# Patient Record
Sex: MALE | Race: WHITE | Employment: FULL TIME | URBAN - METROPOLITAN AREA
[De-identification: names, ages, dates, MRNs, and addresses within clinical notes are randomized per-mention and may not be internally consistent; named-entity substitution may affect disease eponyms.]

---

## 2017-05-23 ENCOUNTER — OFFICE VISIT (OUTPATIENT)
Dept: INTERNAL MEDICINE CLINIC | Age: 52
End: 2017-05-23

## 2017-05-23 VITALS
BODY MASS INDEX: 29.8 KG/M2 | WEIGHT: 220 LBS | HEIGHT: 72 IN | SYSTOLIC BLOOD PRESSURE: 128 MMHG | HEART RATE: 94 BPM | OXYGEN SATURATION: 96 % | DIASTOLIC BLOOD PRESSURE: 88 MMHG

## 2017-05-23 DIAGNOSIS — Q21.0 VSD (VENTRICULAR SEPTAL DEFECT): ICD-10-CM

## 2017-05-23 DIAGNOSIS — Z11.59 NEED FOR HEPATITIS C SCREENING TEST: ICD-10-CM

## 2017-05-23 DIAGNOSIS — E78.5 HYPERLIPIDEMIA, UNSPECIFIED HYPERLIPIDEMIA TYPE: ICD-10-CM

## 2017-05-23 DIAGNOSIS — F33.0 MILD EPISODE OF RECURRENT MAJOR DEPRESSIVE DISORDER (HCC): Primary | ICD-10-CM

## 2017-05-23 DIAGNOSIS — Z11.4 SCREENING FOR HIV (HUMAN IMMUNODEFICIENCY VIRUS): ICD-10-CM

## 2017-05-23 DIAGNOSIS — Z13.31 POSITIVE DEPRESSION SCREENING: ICD-10-CM

## 2017-05-23 DIAGNOSIS — Z13.1 SCREENING FOR DIABETES MELLITUS: ICD-10-CM

## 2017-05-23 PROCEDURE — 3017F COLORECTAL CA SCREEN DOC REV: CPT | Performed by: INTERNAL MEDICINE

## 2017-05-23 PROCEDURE — G8431 POS CLIN DEPRES SCRN F/U DOC: HCPCS | Performed by: INTERNAL MEDICINE

## 2017-05-23 PROCEDURE — G8419 CALC BMI OUT NRM PARAM NOF/U: HCPCS | Performed by: INTERNAL MEDICINE

## 2017-05-23 PROCEDURE — G0444 DEPRESSION SCREEN ANNUAL: HCPCS | Performed by: INTERNAL MEDICINE

## 2017-05-23 PROCEDURE — 99215 OFFICE O/P EST HI 40 MIN: CPT | Performed by: INTERNAL MEDICINE

## 2017-05-23 PROCEDURE — G8427 DOCREV CUR MEDS BY ELIG CLIN: HCPCS | Performed by: INTERNAL MEDICINE

## 2017-05-23 PROCEDURE — 1036F TOBACCO NON-USER: CPT | Performed by: INTERNAL MEDICINE

## 2017-05-23 RX ORDER — DESVENLAFAXINE 50 MG/1
50 TABLET, EXTENDED RELEASE ORAL DAILY
Qty: 90 TABLET | Refills: 1 | Status: SHIPPED | OUTPATIENT
Start: 2017-05-23 | End: 2018-05-18 | Stop reason: SDUPTHER

## 2017-05-23 ASSESSMENT — PATIENT HEALTH QUESTIONNAIRE - PHQ9
SUM OF ALL RESPONSES TO PHQ9 QUESTIONS 1 & 2: 4
3. TROUBLE FALLING OR STAYING ASLEEP: 0
7. TROUBLE CONCENTRATING ON THINGS, SUCH AS READING THE NEWSPAPER OR WATCHING TELEVISION: 2
1. LITTLE INTEREST OR PLEASURE IN DOING THINGS: 2
SUM OF ALL RESPONSES TO PHQ QUESTIONS 1-9: 8
8. MOVING OR SPEAKING SO SLOWLY THAT OTHER PEOPLE COULD HAVE NOTICED. OR THE OPPOSITE, BEING SO FIGETY OR RESTLESS THAT YOU HAVE BEEN MOVING AROUND A LOT MORE THAN USUAL: 0
9. THOUGHTS THAT YOU WOULD BE BETTER OFF DEAD, OR OF HURTING YOURSELF: 0
2. FEELING DOWN, DEPRESSED OR HOPELESS: 2
4. FEELING TIRED OR HAVING LITTLE ENERGY: 2
10. IF YOU CHECKED OFF ANY PROBLEMS, HOW DIFFICULT HAVE THESE PROBLEMS MADE IT FOR YOU TO DO YOUR WORK, TAKE CARE OF THINGS AT HOME, OR GET ALONG WITH OTHER PEOPLE: 0
5. POOR APPETITE OR OVEREATING: 0
6. FEELING BAD ABOUT YOURSELF - OR THAT YOU ARE A FAILURE OR HAVE LET YOURSELF OR YOUR FAMILY DOWN: 0

## 2017-05-25 DIAGNOSIS — Z13.1 SCREENING FOR DIABETES MELLITUS: ICD-10-CM

## 2017-05-25 DIAGNOSIS — E78.5 HYPERLIPIDEMIA, UNSPECIFIED HYPERLIPIDEMIA TYPE: ICD-10-CM

## 2017-05-25 DIAGNOSIS — Z11.59 NEED FOR HEPATITIS C SCREENING TEST: ICD-10-CM

## 2017-05-25 DIAGNOSIS — Z11.4 SCREENING FOR HIV (HUMAN IMMUNODEFICIENCY VIRUS): ICD-10-CM

## 2017-05-25 PROBLEM — Q21.0 VSD (VENTRICULAR SEPTAL DEFECT): Status: ACTIVE | Noted: 2017-05-25

## 2017-05-25 LAB
A/G RATIO: 1.7 (ref 1.1–2.2)
ALBUMIN SERPL-MCNC: 4.6 G/DL (ref 3.4–5)
ALP BLD-CCNC: 65 U/L (ref 40–129)
ALT SERPL-CCNC: 27 U/L (ref 10–40)
ANION GAP SERPL CALCULATED.3IONS-SCNC: 15 MMOL/L (ref 3–16)
AST SERPL-CCNC: 23 U/L (ref 15–37)
BILIRUB SERPL-MCNC: 0.9 MG/DL (ref 0–1)
BUN BLDV-MCNC: 12 MG/DL (ref 7–20)
CALCIUM SERPL-MCNC: 9.3 MG/DL (ref 8.3–10.6)
CHLORIDE BLD-SCNC: 103 MMOL/L (ref 99–110)
CHOLESTEROL, TOTAL: 254 MG/DL (ref 0–199)
CO2: 25 MMOL/L (ref 21–32)
CREAT SERPL-MCNC: 0.7 MG/DL (ref 0.9–1.3)
GFR AFRICAN AMERICAN: >60
GFR NON-AFRICAN AMERICAN: >60
GLOBULIN: 2.7 G/DL
GLUCOSE BLD-MCNC: 85 MG/DL (ref 70–99)
HDLC SERPL-MCNC: 66 MG/DL (ref 40–60)
HEPATITIS C ANTIBODY: NORMAL
LDL CHOLESTEROL CALCULATED: 170 MG/DL
POTASSIUM SERPL-SCNC: 4.4 MMOL/L (ref 3.5–5.1)
SODIUM BLD-SCNC: 143 MMOL/L (ref 136–145)
TOTAL PROTEIN: 7.3 G/DL (ref 6.4–8.2)
TRIGL SERPL-MCNC: 92 MG/DL (ref 0–150)
VLDLC SERPL CALC-MCNC: 18 MG/DL

## 2017-05-25 ASSESSMENT — ENCOUNTER SYMPTOMS
EYE REDNESS: 0
BACK PAIN: 0
ABDOMINAL PAIN: 0
SHORTNESS OF BREATH: 0
WHEEZING: 0
VOMITING: 0
COUGH: 0
SORE THROAT: 0
NAUSEA: 0
DIARRHEA: 0
CONSTIPATION: 0
CHEST TIGHTNESS: 0
RHINORRHEA: 0
SINUS PRESSURE: 0

## 2017-05-26 LAB
ESTIMATED AVERAGE GLUCOSE: 102.5 MG/DL
HBA1C MFR BLD: 5.2 %

## 2017-05-27 LAB — HIV-1 AND HIV-2 ANTIBODIES: NEGATIVE

## 2017-07-25 ENCOUNTER — TELEPHONE (OUTPATIENT)
Dept: INTERNAL MEDICINE CLINIC | Age: 52
End: 2017-07-25

## 2017-07-27 ENCOUNTER — OFFICE VISIT (OUTPATIENT)
Dept: INTERNAL MEDICINE CLINIC | Age: 52
End: 2017-07-27

## 2017-07-27 VITALS
DIASTOLIC BLOOD PRESSURE: 90 MMHG | HEART RATE: 82 BPM | SYSTOLIC BLOOD PRESSURE: 138 MMHG | OXYGEN SATURATION: 97 % | BODY MASS INDEX: 30.79 KG/M2 | WEIGHT: 227 LBS

## 2017-07-27 DIAGNOSIS — F33.1 MODERATE EPISODE OF RECURRENT MAJOR DEPRESSIVE DISORDER (HCC): Primary | ICD-10-CM

## 2017-07-27 PROCEDURE — 99214 OFFICE O/P EST MOD 30 MIN: CPT | Performed by: INTERNAL MEDICINE

## 2017-07-27 PROCEDURE — 1036F TOBACCO NON-USER: CPT | Performed by: INTERNAL MEDICINE

## 2017-07-27 PROCEDURE — G8417 CALC BMI ABV UP PARAM F/U: HCPCS | Performed by: INTERNAL MEDICINE

## 2017-07-27 PROCEDURE — G8427 DOCREV CUR MEDS BY ELIG CLIN: HCPCS | Performed by: INTERNAL MEDICINE

## 2017-07-27 PROCEDURE — 3017F COLORECTAL CA SCREEN DOC REV: CPT | Performed by: INTERNAL MEDICINE

## 2017-07-27 RX ORDER — BUPROPION HYDROCHLORIDE 300 MG/1
300 TABLET ORAL EVERY MORNING
Qty: 30 TABLET | Refills: 2 | Status: SHIPPED | OUTPATIENT
Start: 2017-07-27 | End: 2018-05-09 | Stop reason: SDUPTHER

## 2017-07-27 RX ORDER — CLONAZEPAM 0.5 MG/1
0.5 TABLET ORAL 2 TIMES DAILY PRN
Qty: 30 TABLET | Refills: 1 | Status: SHIPPED | OUTPATIENT
Start: 2017-07-27 | End: 2018-05-30 | Stop reason: SDUPTHER

## 2018-05-09 DIAGNOSIS — F33.1 MODERATE EPISODE OF RECURRENT MAJOR DEPRESSIVE DISORDER (HCC): ICD-10-CM

## 2018-05-10 RX ORDER — BUPROPION HYDROCHLORIDE 300 MG/1
300 TABLET ORAL EVERY MORNING
Qty: 30 TABLET | Refills: 2 | Status: SHIPPED | OUTPATIENT
Start: 2018-05-10 | End: 2018-05-30 | Stop reason: SDUPTHER

## 2018-05-10 RX ORDER — CLONAZEPAM 0.5 MG/1
0.5 TABLET ORAL 2 TIMES DAILY PRN
Qty: 30 TABLET | Refills: 1 | OUTPATIENT
Start: 2018-05-10

## 2018-05-18 ENCOUNTER — TELEPHONE (OUTPATIENT)
Dept: INTERNAL MEDICINE CLINIC | Age: 53
End: 2018-05-18

## 2018-05-18 DIAGNOSIS — F33.0 MILD EPISODE OF RECURRENT MAJOR DEPRESSIVE DISORDER (HCC): ICD-10-CM

## 2018-05-21 RX ORDER — DESVENLAFAXINE 50 MG/1
TABLET, EXTENDED RELEASE ORAL
Qty: 30 TABLET | Refills: 0 | Status: SHIPPED | OUTPATIENT
Start: 2018-05-21 | End: 2018-05-30 | Stop reason: SDUPTHER

## 2018-05-30 ENCOUNTER — OFFICE VISIT (OUTPATIENT)
Dept: INTERNAL MEDICINE CLINIC | Age: 53
End: 2018-05-30

## 2018-05-30 VITALS
DIASTOLIC BLOOD PRESSURE: 82 MMHG | SYSTOLIC BLOOD PRESSURE: 146 MMHG | WEIGHT: 214 LBS | TEMPERATURE: 98.1 F | HEART RATE: 77 BPM | OXYGEN SATURATION: 97 % | BODY MASS INDEX: 29.02 KG/M2 | RESPIRATION RATE: 16 BRPM

## 2018-05-30 DIAGNOSIS — F41.9 ANXIETY: Primary | ICD-10-CM

## 2018-05-30 DIAGNOSIS — F33.1 MODERATE EPISODE OF RECURRENT MAJOR DEPRESSIVE DISORDER (HCC): ICD-10-CM

## 2018-05-30 PROCEDURE — 3017F COLORECTAL CA SCREEN DOC REV: CPT | Performed by: INTERNAL MEDICINE

## 2018-05-30 PROCEDURE — 1036F TOBACCO NON-USER: CPT | Performed by: INTERNAL MEDICINE

## 2018-05-30 PROCEDURE — G8427 DOCREV CUR MEDS BY ELIG CLIN: HCPCS | Performed by: INTERNAL MEDICINE

## 2018-05-30 PROCEDURE — G8417 CALC BMI ABV UP PARAM F/U: HCPCS | Performed by: INTERNAL MEDICINE

## 2018-05-30 PROCEDURE — G0444 DEPRESSION SCREEN ANNUAL: HCPCS | Performed by: INTERNAL MEDICINE

## 2018-05-30 PROCEDURE — 99213 OFFICE O/P EST LOW 20 MIN: CPT | Performed by: INTERNAL MEDICINE

## 2018-05-30 RX ORDER — BUPROPION HYDROCHLORIDE 300 MG/1
300 TABLET ORAL EVERY MORNING
Qty: 90 TABLET | Refills: 2 | Status: SHIPPED | OUTPATIENT
Start: 2018-05-30 | End: 2019-03-11 | Stop reason: SDUPTHER

## 2018-05-30 RX ORDER — DESVENLAFAXINE 50 MG/1
TABLET, EXTENDED RELEASE ORAL
Qty: 90 TABLET | Refills: 1 | Status: SHIPPED | OUTPATIENT
Start: 2018-05-30 | End: 2019-03-11 | Stop reason: SDUPTHER

## 2018-05-30 RX ORDER — CLONAZEPAM 0.5 MG/1
0.5 TABLET ORAL 2 TIMES DAILY PRN
Qty: 30 TABLET | Refills: 1 | Status: SHIPPED | OUTPATIENT
Start: 2018-05-30 | End: 2019-03-11 | Stop reason: ALTCHOICE

## 2018-05-30 ASSESSMENT — PATIENT HEALTH QUESTIONNAIRE - PHQ9
1. LITTLE INTEREST OR PLEASURE IN DOING THINGS: 1
2. FEELING DOWN, DEPRESSED OR HOPELESS: 0
7. TROUBLE CONCENTRATING ON THINGS, SUCH AS READING THE NEWSPAPER OR WATCHING TELEVISION: 1
10. IF YOU CHECKED OFF ANY PROBLEMS, HOW DIFFICULT HAVE THESE PROBLEMS MADE IT FOR YOU TO DO YOUR WORK, TAKE CARE OF THINGS AT HOME, OR GET ALONG WITH OTHER PEOPLE: 0
3. TROUBLE FALLING OR STAYING ASLEEP: 1
8. MOVING OR SPEAKING SO SLOWLY THAT OTHER PEOPLE COULD HAVE NOTICED. OR THE OPPOSITE, BEING SO FIGETY OR RESTLESS THAT YOU HAVE BEEN MOVING AROUND A LOT MORE THAN USUAL: 0
SUM OF ALL RESPONSES TO PHQ QUESTIONS 1-9: 3
SUM OF ALL RESPONSES TO PHQ9 QUESTIONS 1 & 2: 1
6. FEELING BAD ABOUT YOURSELF - OR THAT YOU ARE A FAILURE OR HAVE LET YOURSELF OR YOUR FAMILY DOWN: 0
4. FEELING TIRED OR HAVING LITTLE ENERGY: 0
1. LITTLE INTEREST OR PLEASURE IN DOING THINGS: 0
5. POOR APPETITE OR OVEREATING: 1
SUM OF ALL RESPONSES TO PHQ9 QUESTIONS 1 & 2: 0
9. THOUGHTS THAT YOU WOULD BE BETTER OFF DEAD, OR OF HURTING YOURSELF: 0
SUM OF ALL RESPONSES TO PHQ QUESTIONS 1-9: 1
2. FEELING DOWN, DEPRESSED OR HOPELESS: 0

## 2018-05-30 ASSESSMENT — ENCOUNTER SYMPTOMS
EYES NEGATIVE: 1
ALLERGIC/IMMUNOLOGIC NEGATIVE: 1
GASTROINTESTINAL NEGATIVE: 1

## 2018-07-30 ENCOUNTER — OFFICE VISIT (OUTPATIENT)
Dept: INTERNAL MEDICINE CLINIC | Age: 53
End: 2018-07-30

## 2018-07-30 VITALS
DIASTOLIC BLOOD PRESSURE: 80 MMHG | WEIGHT: 215 LBS | SYSTOLIC BLOOD PRESSURE: 128 MMHG | HEART RATE: 78 BPM | OXYGEN SATURATION: 98 % | BODY MASS INDEX: 29.16 KG/M2

## 2018-07-30 DIAGNOSIS — F33.1 MODERATE EPISODE OF RECURRENT MAJOR DEPRESSIVE DISORDER (HCC): Primary | ICD-10-CM

## 2018-07-30 PROCEDURE — 3017F COLORECTAL CA SCREEN DOC REV: CPT | Performed by: NURSE PRACTITIONER

## 2018-07-30 PROCEDURE — 1036F TOBACCO NON-USER: CPT | Performed by: NURSE PRACTITIONER

## 2018-07-30 PROCEDURE — G8417 CALC BMI ABV UP PARAM F/U: HCPCS | Performed by: NURSE PRACTITIONER

## 2018-07-30 PROCEDURE — 99213 OFFICE O/P EST LOW 20 MIN: CPT | Performed by: NURSE PRACTITIONER

## 2018-07-30 PROCEDURE — G8427 DOCREV CUR MEDS BY ELIG CLIN: HCPCS | Performed by: NURSE PRACTITIONER

## 2018-07-30 NOTE — PATIENT INSTRUCTIONS
Patient called to reschedule her 7-20-17 appointment with Lashae Nolan to 6-23-17.  Patient said she wanted a sooner appointment.     No call back needed unless questions.   Take medication every day, take Pristiq in pm  Take Clonazepam twice a day for 3 weeks  After 3 weeks, try taking one clonazepam midday to see how it is effective  Continue to drink plenty of water

## 2019-02-06 DIAGNOSIS — F33.1 MODERATE EPISODE OF RECURRENT MAJOR DEPRESSIVE DISORDER (HCC): ICD-10-CM

## 2019-02-07 RX ORDER — CLONAZEPAM 0.5 MG/1
TABLET ORAL
Qty: 30 TABLET | Refills: 0 | OUTPATIENT
Start: 2019-02-07

## 2019-03-11 ENCOUNTER — OFFICE VISIT (OUTPATIENT)
Dept: INTERNAL MEDICINE CLINIC | Age: 54
End: 2019-03-11
Payer: COMMERCIAL

## 2019-03-11 VITALS
HEART RATE: 72 BPM | DIASTOLIC BLOOD PRESSURE: 98 MMHG | BODY MASS INDEX: 29.12 KG/M2 | SYSTOLIC BLOOD PRESSURE: 138 MMHG | RESPIRATION RATE: 16 BRPM | TEMPERATURE: 98.3 F | HEIGHT: 72 IN | WEIGHT: 215 LBS

## 2019-03-11 DIAGNOSIS — Z23 NEED FOR PROPHYLACTIC VACCINATION AND INOCULATION AGAINST VARICELLA: ICD-10-CM

## 2019-03-11 DIAGNOSIS — Z12.11 SCREENING FOR COLON CANCER: ICD-10-CM

## 2019-03-11 DIAGNOSIS — R03.0 ELEVATED BP WITHOUT DIAGNOSIS OF HYPERTENSION: Primary | ICD-10-CM

## 2019-03-11 DIAGNOSIS — E78.00 HYPERCHOLESTEROLEMIA: ICD-10-CM

## 2019-03-11 DIAGNOSIS — F33.1 MODERATE EPISODE OF RECURRENT MAJOR DEPRESSIVE DISORDER (HCC): ICD-10-CM

## 2019-03-11 DIAGNOSIS — Z13.1 SCREENING FOR DIABETES MELLITUS: ICD-10-CM

## 2019-03-11 PROBLEM — E78.5 HYPERLIPIDEMIA: Status: RESOLVED | Noted: 2017-05-25 | Resolved: 2019-03-11

## 2019-03-11 PROCEDURE — G8427 DOCREV CUR MEDS BY ELIG CLIN: HCPCS | Performed by: INTERNAL MEDICINE

## 2019-03-11 PROCEDURE — 99214 OFFICE O/P EST MOD 30 MIN: CPT | Performed by: INTERNAL MEDICINE

## 2019-03-11 PROCEDURE — G8417 CALC BMI ABV UP PARAM F/U: HCPCS | Performed by: INTERNAL MEDICINE

## 2019-03-11 PROCEDURE — 1036F TOBACCO NON-USER: CPT | Performed by: INTERNAL MEDICINE

## 2019-03-11 PROCEDURE — 3017F COLORECTAL CA SCREEN DOC REV: CPT | Performed by: INTERNAL MEDICINE

## 2019-03-11 PROCEDURE — G8484 FLU IMMUNIZE NO ADMIN: HCPCS | Performed by: INTERNAL MEDICINE

## 2019-03-11 RX ORDER — BUSPIRONE HYDROCHLORIDE 7.5 MG/1
7.5 TABLET ORAL 3 TIMES DAILY PRN
Qty: 90 TABLET | Refills: 0 | Status: SHIPPED | OUTPATIENT
Start: 2019-03-11 | End: 2019-04-10

## 2019-03-11 RX ORDER — DESVENLAFAXINE 50 MG/1
TABLET, EXTENDED RELEASE ORAL
Qty: 90 TABLET | Refills: 1 | Status: SHIPPED | OUTPATIENT
Start: 2019-03-11 | End: 2020-02-25 | Stop reason: SDUPTHER

## 2019-03-11 RX ORDER — BUPROPION HYDROCHLORIDE 300 MG/1
300 TABLET ORAL EVERY MORNING
Qty: 90 TABLET | Refills: 1 | Status: SHIPPED | OUTPATIENT
Start: 2019-03-11 | End: 2020-02-25 | Stop reason: SDUPTHER

## 2019-03-11 SDOH — HEALTH STABILITY: MENTAL HEALTH: HOW MANY STANDARD DRINKS CONTAINING ALCOHOL DO YOU HAVE ON A TYPICAL DAY?: 1 OR 2

## 2019-03-11 SDOH — HEALTH STABILITY: MENTAL HEALTH: HOW OFTEN DO YOU HAVE A DRINK CONTAINING ALCOHOL?: 4 OR MORE TIMES A WEEK

## 2019-03-11 ASSESSMENT — PATIENT HEALTH QUESTIONNAIRE - PHQ9
SUM OF ALL RESPONSES TO PHQ QUESTIONS 1-9: 0
SUM OF ALL RESPONSES TO PHQ QUESTIONS 1-9: 0
SUM OF ALL RESPONSES TO PHQ9 QUESTIONS 1 & 2: 0
2. FEELING DOWN, DEPRESSED OR HOPELESS: 0
1. LITTLE INTEREST OR PLEASURE IN DOING THINGS: 0

## 2020-02-25 ENCOUNTER — OFFICE VISIT (OUTPATIENT)
Dept: INTERNAL MEDICINE CLINIC | Age: 55
End: 2020-02-25
Payer: COMMERCIAL

## 2020-02-25 VITALS
WEIGHT: 215.6 LBS | HEIGHT: 72 IN | BODY MASS INDEX: 29.2 KG/M2 | RESPIRATION RATE: 14 BRPM | HEART RATE: 89 BPM | SYSTOLIC BLOOD PRESSURE: 124 MMHG | DIASTOLIC BLOOD PRESSURE: 88 MMHG | OXYGEN SATURATION: 96 %

## 2020-02-25 PROCEDURE — G8417 CALC BMI ABV UP PARAM F/U: HCPCS | Performed by: INTERNAL MEDICINE

## 2020-02-25 PROCEDURE — G8484 FLU IMMUNIZE NO ADMIN: HCPCS | Performed by: INTERNAL MEDICINE

## 2020-02-25 PROCEDURE — G8427 DOCREV CUR MEDS BY ELIG CLIN: HCPCS | Performed by: INTERNAL MEDICINE

## 2020-02-25 PROCEDURE — 3017F COLORECTAL CA SCREEN DOC REV: CPT | Performed by: INTERNAL MEDICINE

## 2020-02-25 PROCEDURE — 1036F TOBACCO NON-USER: CPT | Performed by: INTERNAL MEDICINE

## 2020-02-25 PROCEDURE — 99214 OFFICE O/P EST MOD 30 MIN: CPT | Performed by: INTERNAL MEDICINE

## 2020-02-25 RX ORDER — BUSPIRONE HYDROCHLORIDE 7.5 MG/1
7.5 TABLET ORAL 3 TIMES DAILY PRN
Qty: 90 TABLET | Refills: 1 | Status: SHIPPED | OUTPATIENT
Start: 2020-02-25 | End: 2021-03-16

## 2020-02-25 RX ORDER — DESVENLAFAXINE 50 MG/1
TABLET, EXTENDED RELEASE ORAL
Qty: 90 TABLET | Refills: 1 | Status: SHIPPED | OUTPATIENT
Start: 2020-02-25 | End: 2020-08-06

## 2020-02-25 RX ORDER — BUPROPION HYDROCHLORIDE 300 MG/1
300 TABLET ORAL EVERY MORNING
Qty: 90 TABLET | Refills: 1 | Status: SHIPPED | OUTPATIENT
Start: 2020-02-25 | End: 2020-08-06

## 2020-02-25 RX ORDER — BUSPIRONE HYDROCHLORIDE 7.5 MG/1
7.5 TABLET ORAL 3 TIMES DAILY PRN
COMMUNITY
End: 2020-02-25 | Stop reason: SDUPTHER

## 2020-02-25 ASSESSMENT — ENCOUNTER SYMPTOMS
COLOR CHANGE: 0
DIARRHEA: 0
EYE PAIN: 0
WHEEZING: 0
BACK PAIN: 0
PHOTOPHOBIA: 0
SINUS PAIN: 0
CONSTIPATION: 0
SHORTNESS OF BREATH: 0
VOMITING: 0
SINUS PRESSURE: 0
ABDOMINAL PAIN: 0
NAUSEA: 0
COUGH: 0
TROUBLE SWALLOWING: 0

## 2020-02-25 NOTE — PROGRESS NOTES
2020    Wiliam Lord (:  1965) is a 53 y.o. male, here for evaluation of the following medical concerns:    HPI    Depression/anxiety - On pristiq 50mg daily, wellbutrin 300mg, buspar as needed - feels works to take the edge off. The other two medications have been for some years. Life stress much improved. The 10-year ASCVD risk score (Louisa Jones, et al., 2013) is: 5.5%    Values used to calculate the score:      Age: 53 years      Sex: Male      Is Non- : No      Diabetic: No      Tobacco smoker: No      Systolic Blood Pressure: 177 mmHg      Is BP treated: No      HDL Cholesterol: 66 mg/dL      Total Cholesterol: 254 mg/dL    Has VSD from childhood. Has not had TTE in adulthood. No SOB, leg swelling, abdominal swelling, PND/orthopnea. Review of Systems   Constitutional: Negative for appetite change, fatigue, fever and unexpected weight change. No night sweats   HENT: Negative for congestion, ear pain, hearing loss, nosebleeds, sinus pressure, sinus pain, sneezing, tinnitus and trouble swallowing. Eyes: Negative for photophobia, pain and visual disturbance. Respiratory: Negative for cough, shortness of breath and wheezing. Cardiovascular: Negative for chest pain, palpitations and leg swelling. Gastrointestinal: Negative for abdominal pain, constipation, diarrhea, nausea and vomiting. Endocrine: Negative for cold intolerance, heat intolerance, polydipsia, polyphagia and polyuria. Genitourinary: Negative for difficulty urinating, dysuria, frequency, hematuria and urgency. Musculoskeletal: Negative for arthralgias, back pain, joint swelling, myalgias and neck pain. Skin: Negative for color change and rash. Allergic/Immunologic: Negative for environmental allergies, food allergies and immunocompromised state. Neurological: Negative for dizziness, syncope, speech difficulty, weakness, light-headedness, numbness and headaches. Hematological: Negative for adenopathy. Does not bruise/bleed easily. Psychiatric/Behavioral: Negative for dysphoric mood and sleep disturbance. The patient is not nervous/anxious. Prior to Visit Medications    Medication Sig Taking?  Authorizing Provider   busPIRone (BUSPAR) 7.5 MG tablet Take 7.5 mg by mouth 3 times daily as needed Yes Historical Provider, MD   buPROPion (WELLBUTRIN XL) 300 MG extended release tablet Take 1 tablet by mouth every morning Yes Kellie Cagle DO   desvenlafaxine succinate (PRISTIQ) 50 MG TB24 extended release tablet TAKE 1 TABLET DAILY Yes Kellie Cagle DO        No Known Allergies    Past Medical History:   Diagnosis Date    Depression     Heart murmur     VSD (ventricular septal defect and aortic arch hypoplasia        Past Surgical History:   Procedure Laterality Date    WISDOM TOOTH EXTRACTION         Social History     Socioeconomic History    Marital status:      Spouse name: Not on file    Number of children: 1    Years of education: Not on file    Highest education level: Not on file   Occupational History    Occupation:      Comment: [employer]    Financial resource strain: Not on file    Food insecurity:     Worry: Not on file     Inability: Not on file   e-contratos needs:     Medical: Not on file     Non-medical: Not on file   Tobacco Use    Smoking status: Never Smoker    Smokeless tobacco: Never Used   Substance and Sexual Activity    Alcohol use: Yes     Frequency: 4 or more times a week     Drinks per session: 1 or 2     Binge frequency: Never     Comment: One glass of wine nightly    Drug use: No    Sexual activity: Yes     Partners: Female   Lifestyle    Physical activity:     Days per week: Not on file     Minutes per session: Not on file    Stress: Not on file   Relationships    Social connections:     Talks on phone: Not on file     Gets together: Not on file     Attends Hindu service: Not on not radiating to carotids obscuring S1/S2) present. No friction rub. No gallop. Pulmonary:      Effort: Pulmonary effort is normal.      Breath sounds: Normal breath sounds. No wheezing or rales. Abdominal:      General: Bowel sounds are normal.      Palpations: Abdomen is soft. Musculoskeletal: Normal range of motion. General: No tenderness or deformity. Right lower leg: No edema. Left lower leg: No edema. Lymphadenopathy:      Cervical: No cervical adenopathy. Skin:     General: Skin is warm and dry. Findings: No lesion or rash. Neurological:      General: No focal deficit present. Mental Status: He is alert and oriented to person, place, and time. Coordination: Coordination normal.   Psychiatric:         Mood and Affect: Mood normal.         Behavior: Behavior normal.         ASSESSMENT/PLAN:  Hilario Borrero was seen today for established new doctor. Diagnoses and all orders for this visit:    Mild episode of recurrent major depressive disorder ([facility] [address]  Stable on pristiq, wellbutrin, prn buspar. Continue those meds. Screening for colon cancer  -     [facility] - Don Elaine MD, Gastroenterology, [facility]    Encounter for lipid screening for cardiovascular disease  -     Lipid Panel; Future    Screening for diabetes mellitus (DM)  -     Hemoglobin A1C; Future    Hypercholesterolemia  Due for assessment.   -     TSH with Reflex; Future    Moderate episode of recurrent major depressive disorder (HCC)  -     buPROPion (WELLBUTRIN XL) 300 MG extended release tablet; Take 1 tablet by mouth every morning  -     desvenlafaxine succinate (PRISTIQ) 50 MG TB24 extended release tablet; TAKE 1 TABLET DAILY  -     busPIRone (BUSPAR) 7.5 MG tablet; Take 1 tablet by mouth 3 times daily as needed (anxiety)    VSD (ventricular septal defect)  Asymptomatic. Needs assessment of size, shunt, right sided pressures.  Consider cardiology referral.   -     ECHO Complete 2D W Doppler W Color;

## 2020-08-06 RX ORDER — DESVENLAFAXINE 50 MG/1
TABLET, EXTENDED RELEASE ORAL
Qty: 90 TABLET | Refills: 3 | Status: SHIPPED | OUTPATIENT
Start: 2020-08-06 | End: 2021-03-09 | Stop reason: SDUPTHER

## 2020-08-06 RX ORDER — BUPROPION HYDROCHLORIDE 300 MG/1
TABLET ORAL
Qty: 90 TABLET | Refills: 3 | Status: SHIPPED | OUTPATIENT
Start: 2020-08-06

## 2021-03-09 ENCOUNTER — VIRTUAL VISIT (OUTPATIENT)
Dept: INTERNAL MEDICINE CLINIC | Age: 56
End: 2021-03-09

## 2021-03-09 DIAGNOSIS — F32.2 SEVERE DEPRESSION (HCC): Primary | ICD-10-CM

## 2021-03-09 DIAGNOSIS — F33.1 MODERATE EPISODE OF RECURRENT MAJOR DEPRESSIVE DISORDER (HCC): ICD-10-CM

## 2021-03-09 PROCEDURE — 99214 OFFICE O/P EST MOD 30 MIN: CPT | Performed by: INTERNAL MEDICINE

## 2021-03-09 RX ORDER — HYDROXYZINE HYDROCHLORIDE 25 MG/1
25 TABLET, FILM COATED ORAL EVERY 8 HOURS PRN
Qty: 30 TABLET | Refills: 0 | Status: SHIPPED | OUTPATIENT
Start: 2021-03-09 | End: 2021-03-19

## 2021-03-09 RX ORDER — DESVENLAFAXINE 50 MG/1
100 TABLET, EXTENDED RELEASE ORAL DAILY
Qty: 90 TABLET | Refills: 3
Start: 2021-03-09

## 2021-03-09 NOTE — PROGRESS NOTES
Behavioral Health Consultation  Iker Posada Psy.D. Clinical Psychologist  3/10/2021       Time spent with Patient: 33 minutes  Start time: 4:06pm    Stop time: 4:39pm  This is patient's first [facility] appointment. Referring provider: Radha Reid MD   Reason for Consult:  Depression, stress     Feedback for PCP: No action needed. Writer will continue to follow pt. S:    Patient reports he has a history of anxiety and depression. However, his anxiety and depression became unmanageable approximately 6 weeks ago and he had a breakdown approximately 10 days ago. He states that his mother passed away and his fiancée was diagnosed with breast cancer. Alcides George reports that she is doing well and they are hopeful that they will be able to treat the cancer. Patient reports that he feels that he needs to get back to work. He does not like letting down his coworkers and employees. Fiancé reported that patient's boss indicated that he should take time off work. They are obtaining [employer] paperwork.      Depression sx: anhedonia/diminished interest in activities, depressed mood, insomnia, psychomotor agitation, fatigue, feelings of worthlessness, difficulties with concentration and difficulties with memory, symptoms have been present most recently for the past 6 weeks   Anxiety sx: excessive worry and uncontrollable worry, worries about work   SI/HI: Denied   Coping skills: golf    History:    Health habits:   Caffeine: a pop a day   Sleep: difficulties staying asleep, 5 hours of sleep per night   Exercise: Denied   Medication adherence: Yes    Psychiatric history:   Current psychotropic medications:  Hydroxyzine (25mg), desvenlafaxine (100mg), buproprion (300mg), buspirone (7.5mg, PRN)   Past mental health treatment: medications    Social History:    Social: two brothers, step mom, father, 2 brothers, fiance    Family psychiatric history: mother (Alzheimer's)   Employment:     Social History Tobacco Use    Smoking status: Never Smoker    Smokeless tobacco: Never Used   Substance Use Topics    Alcohol use: Yes     Frequency: 4 or more times a week     Drinks per session: 1 or 2     Binge frequency: Never     Comment: One glass of wine nightly      Social History     Substance and Sexual Activity   Drug Use No      Current Outpatient Medications   Medication Sig Dispense Refill    hydrOXYzine (ATARAX) 25 MG tablet Take 1 tablet by mouth every 8 hours as needed for Itching 30 tablet 0    desvenlafaxine succinate (PRISTIQ) 50 MG TB24 extended release tablet Take 2 tablets by mouth daily 90 tablet 3    buPROPion (WELLBUTRIN XL) 300 MG extended release tablet TAKE 1 TABLET EVERY MORNING 90 tablet 3    busPIRone (BUSPAR) 7.5 MG tablet Take 1 tablet by mouth 3 times daily as needed (anxiety) 90 tablet 1     No current facility-administered medications for this visit. O:  MSE:    Appearance: good hygiene   Attitude: cooperative and friendly  Consciousness: alert  Orientation: oriented to person, place, time, general circumstance  Memory: recent and remote memory intact  Attention/Concentration: intact during session  Psychomotor Activity:normal  Eye Contact: normal  Speech: normal rate and volume, well-articulated  Mood: depressed  Affect: stressed, congruent  Perception: within normal limits  Thought Content: within normal limits  Thought Process: logical, coherent and goal-directed  Insight: good  Judgment: intact  Ability to understand instructions: Yes  Ability to respond meaningfully: Yes  Morbid Ideation: no   Suicide Assessment: no suicidal ideation, plan, or intent  Homicidal Ideation: no    A:  Administered PHQ-9 (see below).   PHQ Scores 3/11/2019 5/30/2018 5/30/2018 5/23/2017   PHQ2 Score 0 0 1 4   PHQ9 Score 0 3 1 8     Interpretation of Total Score Depression Severity: 1-4 = Minimal depression, 5-9 = Mild depression, 10-14 = Moderate depression, 15-19 = Moderately severe depression, 20-27 = Severe depression    Assessment/Progress in treatment/Treatment plan:  Patient appears to be experiencing depressed mood and anxiety, exacerbated by recent death of his mother, fiance having cancer, and work stress. Current coping skills include talking with family and golf and factors maintaining symptoms include maladaptive beliefs about work. May benefit from brief intervention from writer for adaptive coping skill development. Will refer to specialty mental health in the future if indicated. Diagnosis:    1. Depression, unspecified depression type       Patient Active Problem List   Diagnosis    Severe depression ([address]    VSD (ventricular septal defect)    Hypercholesterolemia        Plan:  Set the following goals: 1) behavioral activation 2) deep breathing 3) prioritizing mental health over work    Follow-up:   Return in about 2 weeks (around 3/24/2021). Pt interventions:  Established rapport, [place]-setting to identify pt's primary goals for [facility] visit / overall health, Supportive techniques, Provided Psychoeducation re: depression, Engaged in treatment planning, Discussed and set plan for behavioral activation and Trained in diaphragmatic breathing    --------------------------------------------------------------------------------------------------------    The following was discussed with patient at initial behavioral health visit: pt provided informed consent for the behavioral health program. Discussed with patient model of service to include the limits of confidentiality (i.e. abuse reporting, suicide/homicide intervention, subpoena of court, documentation in medical record/coordination with primary care team, etc.) and short-term intervention focused approach. Pt indicated understanding. Documentation was done using voice recognition dragon software. Every effort was made to ensure accuracy; however, inadvertent, unintentional computerized transcription errors may be present.

## 2021-03-09 NOTE — PROGRESS NOTES
Hetal Mooney (:  1965) is a 63 y.o. male,Established patient, here for evaluation of the following chief complaint(s):   Depression      ASSESSMENT/PLAN:  1. Severe depression ([address]  Assessment & Plan: With current exacerbation. Denies SI. Will increase pristiq to 100mg daily since was helpful in the past, continue wellbutrin 300mg daily and prn buspar and add prn hydroxyzine for sleep. Scheduled with Dr. Mika Gonzalez tomorrow and Arlyn Traylor on Monday. Will fill out [facility] paperwork. Crisis phone number given to patient and his girlfriend today. Orders:  -     hydrOXYzine (ATARAX) 25 MG tablet; Take 1 tablet by mouth every 8 hours as needed for Itching, Disp-30 tablet, R-0Normal  2. Moderate episode of recurrent major depressive disorder [facility])  Assessment & Plan: With current exacerbation. Denies SI. Will increase pristiq to 100mg daily since was helpful in the past, continue wellbutrin 300mg daily and prn buspar and add prn hydroxyzine for sleep. Scheduled with Dr. Mika Gonzalez tomorrow and Arlyn Traylor on Monday. Will fill out [facility] paperwork. Crisis phone number given to patient and his girlfriend today. Orders:  -     desvenlafaxine succinate (PRISTIQ) 50 MG TB24 extended release tablet; Take 2 tablets by mouth daily, Disp-90 tablet, R-3Adjust Sig      Return in about 1 week (around 3/16/2021) for anxiety/depression f/u. SUBJECTIVE/OBJECTIVE:  HPI    Depression worsening acutely over the last week or so. Has been worse   Lots of crying  Has support from his girlfriend. Difficulty sleeping. Mom passed away in January. Girlfriend diagnosed with cancer. Stress at work. Taking his meds as rx'ed. Has taken the buspar a bit more recently. Taking twice a day. Guilt. Difficulty concentrating. Not sleeping well. No active suicidal thoughts. Headache. Past meds include effexor for years then switched to pristiq in 2017  One prior psych hospitalization in the last 10 years.      Review of Systems (During XFH-82 public health emergency), evaluation of the following organ systems was limited: Vitals/Constitutional/EENT/Resp/CV/GI//MS/Neuro/Skin/Heme-Lymph-Imm. Pursuant to the emergency declaration under the [facility], [address] [place] [address] authority and the [employer] and [employer] Act, this Virtual Visit was conducted with patient's (and/or legal guardian's) consent, to reduce the patient's risk of exposure to COVID-19 and provide necessary medical care. The patient (and/or legal guardian) has also been advised to contact this office for worsening conditions or problems, and seek emergency medical treatment and/or call 911 if deemed necessary. Patient identification was verified at the start of the visit: Yes    Services were provided through a video synchronous discussion virtually to substitute for in-person clinic visit. Patient was located at home and provider was located in office or at home. An electronic signature was used to authenticate this note.     --Hortensia Jaeger MD

## 2021-03-10 ENCOUNTER — OFFICE VISIT (OUTPATIENT)
Dept: PSYCHOLOGY | Age: 56
End: 2021-03-10
Payer: COMMERCIAL

## 2021-03-10 DIAGNOSIS — F32.A DEPRESSION, UNSPECIFIED DEPRESSION TYPE: Primary | ICD-10-CM

## 2021-03-10 PROCEDURE — 90791 PSYCH DIAGNOSTIC EVALUATION: CPT | Performed by: PSYCHOLOGIST

## 2021-03-10 NOTE — PATIENT INSTRUCTIONS
Deep breathing    Why does this work? When we are stressed or anxious, our stress response in the body is turned on. We can use deep breathing to turn this off. There is a nerve in the body called the, vagus nerve, that helps relax your body. When we take deep, belly breaths, we place pressure on this nerve to help the body relax. Also, you are allowing oxygen to get to your body and brain and breathing out the carbon dioxide which is a toxin to the body. Deep breathing instructions:    1 Sit comfortably, with your knees bent and your shoulders, head and neck relaxed. You can close your eyes if comfortable or find a comfortable place to focus your eyes (e.g. spot on the wall/floor where eyes are not strained)  2 Place one hand on your upper chest and the other just below your rib cage. This will allow you to feel your diaphragm move as you breathe. 3 Breathe in slowly through your nose so that your stomach moves out against your hand. The hand on your chest should remain as still as possible. 4 Tighten your stomach muscles, letting them fall inward as you exhale through pursed lips. The hand on your upper chest must remain as still as possible. Note: You may notice an increased effort will be needed to use the diaphragm correctly. At first, you'll probably get tired while doing this exercise or find your thoughts wondering. But keep at it, because with continued practice, diaphragmatic breathing will become easy and automatic. How often should I practice this exercise? At first, practice this exercise 3-5 minutes about 3-4 times per day. Gradually increase the amount of time you spend doing this exercise, and perhaps even increase the effort of the exercise by placing a book on your abdomen. It is important to practice this skill regularly, even when you don't \"need\" it. Eric: [email or web address]    [place] video:  [email or web address]    TIPS  1. Try to focus on one thought or phrase that is neutral or positive. If your mind wonders, that is okay, bring your attention back to that thought/phrase. 2. Practice when you do not need this skill. It will not work if the first time practicing is during an emotional emergency. Your body and brain need to learn how this skill works  3.  You don't have to be perfect at it, just do your best

## 2021-03-15 ENCOUNTER — OFFICE VISIT (OUTPATIENT)
Dept: PSYCHIATRY | Age: 56
End: 2021-03-15
Payer: COMMERCIAL

## 2021-03-15 DIAGNOSIS — F32.A DEPRESSIVE DISORDER: Primary | ICD-10-CM

## 2021-03-15 DIAGNOSIS — F41.9 ANXIETY: ICD-10-CM

## 2021-03-15 PROCEDURE — 99204 OFFICE O/P NEW MOD 45 MIN: CPT | Performed by: NURSE PRACTITIONER

## 2021-03-15 NOTE — PROGRESS NOTES
Outpatient Initial Psychiatric Evaluation    Lilly Baker  : 1965     Patient provided informed consent for the behavioral health program. Discussed with patient model of service to include the limits of confidentiality (i.e. abuse reporting, suicide intervention, etc.) and short-term intervention focused approach. Pt indicated understanding. Feedback given to PCP. Chief Complaint: Depression, anxiety    History of Present Illness:  Context: Stress  Location: Mood, anxiety  Duration: Ongoing   Severity: Moderate  Associated Symptoms: As below    Initial Presentation:  Taken from PCP note (2021)  1. Severe depression ([address]  Assessment & Plan: With current exacerbation. Denies SI. Will increase pristiq to 100mg daily since was helpful in the past, continue wellbutrin 300mg daily and prn buspar and add prn hydroxyzine for sleep. Scheduled with Dr. Jeannie Smith tomorrow and Monet Farfan on Monday. Will fill out [facility] paperwork. Crisis phone number given to patient and his girlfriend today. Orders:  -     hydrOXYzine (ATARAX) 25 MG tablet; Take 1 tablet by mouth every 8 hours as needed for Itching, Disp-30 tablet, R-0Normal  2. Moderate episode of recurrent major depressive disorder [facility])  Assessment & Plan: With current exacerbation. Denies SI. Will increase pristiq to 100mg daily since was helpful in the past, continue wellbutrin 300mg daily and prn buspar and add prn hydroxyzine for sleep. Scheduled with Dr. Jeannie Smith tomorrow and Monet Farfan on Monday. Will fill out [facility] paperwork. Crisis phone number given to patient and his girlfriend today. Orders:  -     desvenlafaxine succinate (PRISTIQ) 50 MG TB24 extended release tablet; Take 2 tablets by mouth daily, Disp-90 tablet, R-3Adjust Sig    INITIAL PSYCHIATRIC ASSESMENT:  Lilly Baker is a 63 y.o. male with a history of depression who was referred to the outpatient psychiatric clinic.  History was obtained from patient, patient's girlfriend, and chart review. Patient's girlfriend called into the appointment with verbal permission from the patient. Home reports increased depression recently, tearfulness, difficulty concentrating (more so at work but also notes some issues at home). He called off work last week due to its severity. Had been going on for the past 3 weeks before that. He endorses a sleep disturbance, issues falling and staying asleep, doesn't feel rested upon awakening. Also reports decreased appetite. He also endorses anxiety, racing thoughts, ruminating thoughts, worries about what others will think of him at work, feels as though he takes things personally, feels more irritable, \"on edge\". He does note that these symptoms have slightly improved over the past few days. Denies thoughts of self-harm, SI/HI, AVH. He has had a headache for the past 3 weeks, denies any recent head injuries that could cause this. Patient identified stressors include his mother passed away recently (01/2021) after being moved to an AL and going into hospice. His father was sick (although has been getting better recently). Girlfriend was diagnosed with cancer shortly after his mother passed. Also has been experiencing recent issues at work, noting corporate came in and wants to make changes. Per GF, patient is very hard on himself and takes things personally. He verbalizes some anxiety that he has not yet received his FMLA paperwork from his job. Worries they will replace him. He is currently on Pristiq and Wellbutrin, with the Pristiq dose recently being increased 3/9. Has PRN Vistaril but he is unsure if these are helpful. He does note some improvement in his symptoms, stating they were an \"8/10\" last week and a \"3/10\" today. No flowsheet data found. Interpretation of ANGIE-7 score: 5-9 = mild anxiety, 10-14 = moderate anxiety, 15+ = severe anxiety. Recommend referral to behavioral health for scores 10 or greater.     No data recorded  Interpretation of PHQ-9 score:  1-4 = minimal depression, 5-9 = mild depression, 10-14 = moderate depression; 15-19 = moderately severe depression, 20-27 = severe depression    Past Psychiatric History:  Past Diagnosis: Depression   Past Suicide Attempts: Denies   Previous Admits: 1x at a hospital in [place] (2010)  Outpatient Services: Denies   Past Medication Trials: Buspar, Pristiq, Wellbutrin, Hydroxyzine   Access to Firearms: No  Family Hx of Psychiatric Disorders: Denies     Substance Abuse History:  Nicotine: Denies   ETOH: Occasional, recently decreased  Illicit: Denies   Prescription: Denies      Psychosocial/Family History:   Relationship Status: In a relationship  Children: None  Support System: Girlfriend  Housing: Lives with    Education: [employer] degree, [employer] [place]  Income: Working as a    Legal: Denies   Abuse/Trauma: Denies    Current Meds  Current Outpatient Medications on File Prior to Visit   Medication Sig Dispense Refill    hydrOXYzine (ATARAX) 25 MG tablet Take 1 tablet by mouth every 8 hours as needed for Itching 30 tablet 0    desvenlafaxine succinate (PRISTIQ) 50 MG TB24 extended release tablet Take 2 tablets by mouth daily 90 tablet 3    buPROPion (WELLBUTRIN XL) 300 MG extended release tablet TAKE 1 TABLET EVERY MORNING 90 tablet 3     No current facility-administered medications on file prior to visit.       History:  Past Medical History:   Diagnosis Date    Depression     Heart murmur     VSD (ventricular septal defect and aortic arch hypoplasia       Past Surgical History:   Procedure Laterality Date    WISDOM TOOTH EXTRACTION       Family History   Problem Relation Age of Onset    Dementia Mother     Prostate Cancer Father 76    Other Maternal Grandmother         CVA    Stroke Maternal Grandmother     Colon Cancer Neg Hx     Breast Cancer Neg Hx      Social History     Socioeconomic History    Marital status:      Spouse name: None    Number of children: 0    Years of self-injury and suicidal ideas. The patient is nervous/anxious. All other systems reviewed and are negative. Physical Exam  Physical Exam  Vitals signs reviewed. Constitutional:       General: He is not in acute distress. Appearance: Normal appearance. Interventions: Face mask in place. HENT:      Head: Normocephalic. Cardiovascular:      Rate and Rhythm: Normal rate. Pulmonary:      Effort: Pulmonary effort is normal.   Musculoskeletal: Normal range of motion. Neurological:      Mental Status: He is alert. Mental status is at baseline. Gait: Gait is intact. Psychiatric:         Attention and Perception: Attention and perception normal.         Mood and Affect: Affect normal. Mood is anxious and depressed. Speech: Speech normal.         Behavior: Behavior normal. Behavior is cooperative. Thought Content: Thought content normal.         Cognition and Memory: Cognition and memory normal.         Judgment: Judgment normal.     OBJECTIVE  Vital Signs:  Vitals:    03/16/21 1213   BP: (!) 132/90   Temp: 96.9 °F (36.1 °C)     Labs:  No visits with results within 6 Month(s) from this visit.    Latest known visit with results is:   Orders Only on 05/25/2017   Component Date Value    Hemoglobin A1C 05/25/2017 5.2     eAG 05/25/2017 102.5     Hepatitis C Ab 05/25/2017 Non-reactive     HIV-1/HIV-2 Ab 05/25/2017 Negative     Cholesterol, Total 05/25/2017 254*    Triglycerides 05/25/2017 92     HDL 05/25/2017 66*    LDL Calculated 05/25/2017 170*    VLDL Cholesterol Calcula* 05/25/2017 18     Sodium 05/25/2017 143     Potassium 05/25/2017 4.4     Chloride 05/25/2017 103     CO2 05/25/2017 25     Anion Gap 05/25/2017 15     Glucose 05/25/2017 85     BUN 05/25/2017 12     CREATININE 05/25/2017 0.7*    GFR Non- 05/25/2017 >60     GFR  05/25/2017 >60     Calcium 05/25/2017 9.3     Total Protein 05/25/2017 7.3     Albumin 05/25/2017 4. 6     Albumin/Globulin Ratio 05/25/2017 1.7     Total Bilirubin 05/25/2017 0.9     Alkaline Phosphatase 05/25/2017 65     ALT 05/25/2017 27     AST 05/25/2017 23     Globulin 05/25/2017 2.7       Last Drug screen:   No results found for: [place], LABBENZ, COCAIMETSCRU, THC, MDMA, LABMETH, OPIATESCREENURINE, OXTCOSU, PHENCYCLIDINESCREENURINE, PROPOXYPHENE, METAMPU    PSYCHIATRIC ASSESSMENT     Mental Status Examination:    Appearance: WM, appears stated age, wearing personal attire, good grooming and hygiene  Behavior/Attitude Toward Examiner: Cooperative, attentive, good eye contact  Speech: Spontaneous, normal rate, normal volume and well articulated   Mood: \"Depressed\", anxious  Affect: Mood congruent   Thought Processes: Logical  Thought Content: Denies SI, denies HI, no delusions voiced, no obsessions  Perceptions: Denies AVH, did not appear to be RTIS  Attention: Intact  Abstraction: Intact  Cognition: Alert and oriented to person, place, time, and situation, recall intact  Insight: WNL   Judgment: WNL     Diagnoses  Encounter Diagnoses   Name Primary?  Depressive disorder Yes    Anxiety       Plan   Reviewed outpatient provider, nursing, and ancillary staff notes. Evaluated medications and assessed for side effects and effectiveness. Assessed patient's educational needs including reviewing Plan of Care, medications and diagnosis. 1. Safety: NO Imminent risk of danger to/self/others based on the factors considered below. Appropriate for outpatient level of care. Safety plan includes: 911, PES, hotlines, and interventions discussed today.  Risk factors: Age <25 or >49, male gender, depressed mood, chronic pain or medical illness, social isolation.    Protective factors: Denies suicidal ideation/plan/intent, does not have access to guns or weapons, patient is future oriented in conversation and is zachery for safety, no prior suicide attempts, no substance abuse, patient has social or family support, no active psychosis or cognitive dysfunction, physically healthy, compliant with recommended medications, collateral information from girlfriend confirms patient safety. 2.  Psychiatric   Medications: Pristiq 100 mg daily, Wellbutrin  mg daily. R/B/SE/A reviewed with patient who consents to treatment.  OARRS ran and reviewed 03/16/2021   Labs: reviewed in [address].  03/15: Continue medications unchanged as he recently had a dose increase of Pristiq and has noticed some improvement in his symptoms. Will DC PRN Buspar as this medication is typically not dosed as needed. Can continue PRN Vistaril, may increase to 50 mg Q8 PRN if he feels it is not doing anything at current dose. 3.  Substance Abuse    No active issues  4. Medical   Following with Viktor Allen MD  5. Return in about 3 weeks (around 4/5/2021). Melina Viramontes MPH, PMHNP-BC  03/16/21    Total time: 50 minutes spent with patient completing evaluation process and more than 50% of the time was spent completing evaluation and planning treatment with the patient.

## 2021-03-16 VITALS — DIASTOLIC BLOOD PRESSURE: 90 MMHG | TEMPERATURE: 96.9 F | SYSTOLIC BLOOD PRESSURE: 132 MMHG

## 2021-03-16 ASSESSMENT — ENCOUNTER SYMPTOMS
CHEST TIGHTNESS: 0
ABDOMINAL PAIN: 0
SORE THROAT: 0
VOMITING: 0
NAUSEA: 0
SHORTNESS OF BREATH: 0
DIARRHEA: 0

## 2021-03-19 NOTE — PROGRESS NOTES
Behavioral Health Consultation  Princess Antonio Psy.D. Clinical Psychologist  3/22/2021       Start time 4:00am  Stop time 4:25am    Time spent with Patient: 25 minutes  This is patient's second [facility] appointment. Referring provider: Figueroa López MD   Reason for Consult:  Depression, stress     Feedback for PCP: No action needed. Writer will continue to follow pt. S:    Pt set the following goals at last visit: 1) behavioral activation 2) deep breathing 3) prioritizing mental health over work    Patient reports that his mood has improved significantly. Still feels that he has some lingering depressed mood. Has not it helpful to be off of work and gain perspective regarding the importance of work. Has been staying busy and engaging in hobbies as advised. Patient reports that he has also been utilizing deep breathing, which is been helpful. He plans to go back to work this Friday. Needs letter faxed to Felisha Chopra regarding return to work.      Depression sx: anhedonia/diminished interest in activities, depressed mood, insomnia, psychomotor agitation, fatigue, feelings of worthlessness, difficulties with concentration and difficulties with memory, symptoms have been present most recently for the past 6 weeks   Anxiety sx: excessive worry and uncontrollable worry, worries about work   SI/HI: Denied   Coping skills: golf    History:    Health habits:   Caffeine: a pop a day   Sleep: difficulties staying asleep, 5 hours of sleep per night   Exercise: Denied   Medication adherence: Yes    Psychiatric history:   Current psychotropic medications:  desvenlafaxine (100mg), buproprion (300mg)   Past mental health treatment: medications    Social History:    Social: two brothers, step mom, father, 2 brothers, fiance    Family psychiatric history: mother (Alzheimer's)   Employment:     Social History     Tobacco Use    Smoking status: Never Smoker    Smokeless tobacco: Never Used   Substance Use Topics    Alcohol use: Yes     Frequency: 4 or more times a week     Drinks per session: 1 or 2     Binge frequency: Never     Comment: One glass of wine nightly      Social History     Substance and Sexual Activity   Drug Use No      Current Outpatient Medications   Medication Sig Dispense Refill    desvenlafaxine succinate (PRISTIQ) 50 MG TB24 extended release tablet Take 2 tablets by mouth daily 90 tablet 3    buPROPion (WELLBUTRIN XL) 300 MG extended release tablet TAKE 1 TABLET EVERY MORNING 90 tablet 3     No current facility-administered medications for this visit. O:  MSE:    Appearance: good hygiene   Attitude: cooperative and friendly  Consciousness: alert  Orientation: oriented to person, place, time, general circumstance  Memory: recent and remote memory intact  Attention/Concentration: intact during session  Psychomotor Activity:normal  Eye Contact: normal  Speech: normal rate and volume, well-articulated  Mood: depressed  Affect: stressed, congruent  Perception: within normal limits  Thought Content: within normal limits  Thought Process: logical, coherent and goal-directed  Insight: good  Judgment: intact  Ability to understand instructions: Yes  Ability to respond meaningfully: Yes  Morbid Ideation: no   Suicide Assessment: no suicidal ideation, plan, or intent  Homicidal Ideation: no    A:  Administered PHQ-9 (see below). PHQ Scores 3/11/2019 5/30/2018 5/30/2018 5/23/2017   PHQ2 Score 0 0 1 4   PHQ9 Score 0 3 1 8     Interpretation of Total Score Depression Severity: 1-4 = Minimal depression, 5-9 = Mild depression, 10-14 = Moderate depression, 15-19 = Moderately severe depression, 20-27 = Severe depression    Assessment/Progress in treatment/Treatment plan:  Patient appears to be experiencing depressed mood and anxiety, exacerbated by recent death of his mother, fiance having cancer, and work stress.  Current coping skills include talking with family and golf and factors maintaining

## 2021-03-22 ENCOUNTER — OFFICE VISIT (OUTPATIENT)
Dept: PSYCHOLOGY | Age: 56
End: 2021-03-22
Payer: COMMERCIAL

## 2021-03-22 DIAGNOSIS — F32.A DEPRESSION, UNSPECIFIED DEPRESSION TYPE: Primary | ICD-10-CM

## 2021-03-22 PROCEDURE — 90832 PSYTX W PT 30 MINUTES: CPT | Performed by: PSYCHOLOGIST

## 2021-03-26 ENCOUNTER — VIRTUAL VISIT (OUTPATIENT)
Dept: PSYCHOLOGY | Age: 56
End: 2021-03-26
Payer: COMMERCIAL

## 2021-03-26 DIAGNOSIS — F32.A DEPRESSION, UNSPECIFIED DEPRESSION TYPE: ICD-10-CM

## 2021-03-26 DIAGNOSIS — F41.9 ANXIETY DISORDER, UNSPECIFIED TYPE: Primary | ICD-10-CM

## 2021-03-26 PROCEDURE — 90832 PSYTX W PT 30 MINUTES: CPT | Performed by: PSYCHOLOGIST

## 2021-03-26 NOTE — PROGRESS NOTES
Behavioral Health Consultation  Peyton Alvarez Psy.D. Clinical Psychologist  3/26/2021       Start time 10:30am Stop time 11:05am    Time spent with Patient: 35 minutes  This is patient's third [facility] appointment. Reason for Consult:  Depression, anxiety  Referring Provider: PCP    Feedback for PCP: No action needed. Patient attempted to go back to work but had a meeting with his boss beforehand that caused significant stress. She does not think that he is ready to return to work due to him crying during this meeting. Patient likely needs more ongoing long-term therapy to address deep rooted beliefs (leading to distorted perception of work) that are perpetuating current depression and anxiety. Will bridge care until he is connected with weekly therapy. Recommended one more week off of work with goal of getting patient back to work as soon as possible. Discussed that extended time off work can increase depression and anxiety. At initial visit, pt provided informed consent for the behavioral health program. Discussed with patient model of service to include the limits of confidentiality (i.e. abuse reporting, suicide intervention, etc.) and short-term intervention focused approach. Pt indicated understanding    TELEHEALTH VISIT -- Audio and video (During ENNNB-28 public health emergency)  }  Pursuant to the emergency declaration under the [facility], [facility] waiver authority and the [employer] and [employer] General Act, this visit was conducted, with patient's consent, to reduce the patient's risk of exposure to COVID-19 and provide continuity of care for an established patient. Services were provided through a telehealth discussion to substitute for in-person clinic visit. Pt gave verbal informed consent to participate in telehealth services.      Consent:  He and/or health care decision maker is aware that that he may receive a bill for this service, depending on his insurance coverage, and has provided verbal consent to proceed: Yes    Conducted a risk-benefit analysis and determined that the patient's presenting problems are consistent with the use of telepsychology. Determined that the patient has sufficient knowledge and skills in the use of technology enabling them to adequately benefit from telepsychology. It was determined that this patient was able to be properly treated without an in-person session. Patient verified that they were currently located at the [facility] [address] address that was provided during registration. Verified the following information:  Patient's identification: Yes  Patient location: [address] [place] [address]  Patient's call back number: [phone]   Patient's emergency contact's name and number, as well as permission to contact them if needed: Extended Emergency Contact Information  Primary Emergency Contact: Jonathan Bailey Phone: [phone]  Relation: Other  Preferred language: English   needed? No  Secondary Emergency Contact: Helene Isidro  Address: [address] [place] [address] Phone: [phone]  Mobile Phone: [phone]  Relation: Spouse     Provider location: [address] affirm this is an episode with a patient who has not had a related appointment within my department in the past 7 days or scheduled within the next 24 hours. S:    Pt set the following goals at last visit: 1) practicing visually leaving work at work 2) acceptance of work situation and environment 3) being more vocal at work about frustrations    Patient reports that he attempted to go back to work today. He had a meeting with his boss prior to being able to return to work. He met with her this morning at [facility]. States that he was anxious over the last few days since he found out about this meeting.   Was fearful that he was going to be fired or that things were going to have changed drastically at work. Patient states that he pretty immediately started crying during this meeting. His boss said that he is not ready to return back to work. Discussed how patient's perception of the situation and boss being \"frustrated\" was likely a misperception of the situation. Based on objective information, it appeared that boss was simply checking in on patient. Patient had a difficult time identifying misperceptions.      Depression sx: anhedonia/diminished interest in activities, depressed mood, insomnia, psychomotor agitation, fatigue, feelings of worthlessness, difficulties with concentration and difficulties with memory, symptoms have been present most recently for the past 6 weeks   Anxiety sx: excessive worry and uncontrollable worry, worries about work, letting people down   SI/HI: Denied   Coping skills: golf    History:    Health habits:   Caffeine: a pop a day   Sleep: difficulties staying asleep, 5 hours of sleep per night   Exercise: Denied   Medication adherence: Yes    Psychiatric history:   Current psychotropic medications:  desvenlafaxine (100mg), buproprion (300mg)   Past mental health treatment: medications    Social History:    Social: two brothers, step mom, father, 2 brothers, fiance    Family psychiatric history: mother (Alzheimer's)   Employment:     Social History     Tobacco Use    Smoking status: Never Smoker    Smokeless tobacco: Never Used   Substance Use Topics    Alcohol use: Yes     Frequency: 4 or more times a week     Drinks per session: 1 or 2     Binge frequency: Never     Comment: One glass of wine nightly      Social History     Substance and Sexual Activity   Drug Use No      Current Outpatient Medications   Medication Sig Dispense Refill    desvenlafaxine succinate (PRISTIQ) 50 MG TB24 extended release tablet Take 2 tablets by mouth daily 90 tablet 3    buPROPion (WELLBUTRIN XL) 300 MG extended release tablet TAKE 1 TABLET EVERY MORNING 90 tablet 3     No current facility-administered medications for this visit. O:  MSE:    Appearance: good hygiene   Attitude: cooperative and friendly  Consciousness: alert  Orientation: oriented to person, place, time, general circumstance  Memory: recent and remote memory intact  Attention/Concentration: lost train of thought while speaking  Psychomotor Activity:normal  Eye Contact: normal  Speech: normal rate and volume, well-articulated  Mood: depressed, anxious  Affect: stressed, congruent  Perception: within normal limits  Thought Content: perseverative  Thought Process: logical, coherent and goal-directed  Insight: poor  Judgment: intact  Ability to understand instructions: Yes  Ability to respond meaningfully: Yes  Morbid Ideation: no   Suicide Assessment: no suicidal ideation, plan, or intent  Homicidal Ideation: no    A:  Administered PHQ-9 (see below). PHQ Scores 3/11/2019 5/30/2018 5/30/2018 5/23/2017   PHQ2 Score 0 0 1 4   PHQ9 Score 0 3 1 8     Interpretation of Total Score Depression Severity: 1-4 = Minimal depression, 5-9 = Mild depression, 10-14 = Moderate depression, 15-19 = Moderately severe depression, 20-27 = Severe depression    Assessment/Progress in treatment/Treatment plan:  Patient appears to be experiencing depressed mood and anxiety, exacerbated by recent death of his mother, fiance having cancer, and work stress. Current coping skills include talking with family and golf and factors maintaining symptoms include maladaptive beliefs about work. Is engaged in behavioral health treatment and initially reported improvement in mood. However, when trying to return to work had an increase in anxiety. May benefit from additional brief intervention from writer for adaptive coping skill development, but will refer to specialty mental health given patient's severity of symptoms and difficulties grasping specific coping skills. Diagnosis:    1.  Anxiety disorder, unspecified type    2. Depression, unspecified depression type       Patient Active Problem List   Diagnosis    Severe depression ([facility] [address]    VSD (ventricular septal defect)    Hypercholesterolemia        Plan:  Set the following goals: 1) identify thinking errors 2) talk to boss about whether you can return to work with accommodations     Follow-up:   Return in about 1 week (around 4/2/2021). Pt interventions:  Established rapport, [place]-setting to identify pt's primary goals for [facility] visit / overall health, Supportive techniques, Provided Psychoeducation re: anxiety, avoidance and Cognitive strategies to target maladaptive thoughts including \"I cannot let people down\" \"If I miss work, I am letting people down:, discussed importance of returning to work, but not for same reasons patient wants to return to work    --------------------------------------------------------------------------------------------------------    The following was discussed with patient at initial behavioral health visit: pt provided informed consent for the behavioral health program. Discussed with patient model of service to include the limits of confidentiality (i.e. abuse reporting, suicide/homicide intervention, subpoena of court, documentation in medical record/coordination with primary care team, etc.) and short-term intervention focused approach. Pt indicated understanding. Documentation was done using voice recognition dragon software. Every effort was made to ensure accuracy; however, inadvertent, unintentional computerized transcription errors may be present.

## 2021-03-26 NOTE — PATIENT INSTRUCTIONS
Bobby Knight can review this list of different types of thinking errors. It may be helpful for you to identify the top 2-3 errors that you struggle with. That way when you start to think about certain things, you can remind yourself that those thoughts fall into that thinking error: You can get on their websites and see what private insurances they accept, just an [address] [address] [place]    1. Long-term therapists for adults with private insurance or self-pay  [employer] Psychologist  o Does not accept Medicaid, but does accept private insurance and medicare  o [address]  o [phone]  o [email or web address]   Adult [facility] Natalie Berumen [address] [facility], [address], [address] [place]  o [phone]  o [email or web address]   [facility] [place]       o [employer]       o [phone]  o [address] [facility], [address] [facility]  o 100-155 per session  o Individual, couples, and group counseling  o Do not bill insurance, but can provide you with bills that you can submit to your insurance to try to obtain reimbursement   [address] [facility] and [address] [facility] and [address]  o [phone]  o [email or web address]  o [email or web address]   [facility]  o Mainly self-pay, but will at times offer services for those with financial limitations  o [address], [place], [address] [place] [address] [place], [place]   o [phone]  o [email or web address]  o [email or web address]   [facility]   o Appears to be mainly self-pay, but may call to see if your insurance is accepted  o [address] [place], [address] [place], [address]  o [phone]  o [email or web address]   The R[address]. [address]  o They do not bill insurance, but will provide you with a receipt for you to try to get reimbursed  o Considered out-of-network providers  o [address], [place], [address] [facility]  o Phone: [phone]  o [employer]/   [facility]  o Accept many private insurance Radha Santa., [facility], and [facility]  o [phone]  o [email or web address] Engineering  o Accepts many insurances and also offers self-pay discounts  o Many different locations across [place] and [facility]  o [phone]  o Elva RobledoAtlassian  [address]  o Insurance/payment not mentioned on website  o [address], [place], [place], [address]  o [phone]  o [employer]EX [facility]  o Insurance/payment not mentioned on website  o [address], [place]  o [phone]  o [address] [place] [address]. [facility]  o Accepts many insurances  o [address] [facility], [place]  o [phone]  o [email or web address]   [employer]   o Will provide you with a bill that you can submit to insurance to try to get reimbursed  o Likely will be out-of-network provider  o [address], [place], [address]  o [email or web address]  [address] to have a Wilma Wu affiliation  o [address], [place], [address] location as well  o [phone]  o [employer]  [address] many private insurances and Medicare  o [address] [facility] [address], [address] [address], [place]  o [phone] ext. [address] [facility] [address] Louis Liu) [facility] many private insurances  o [address] [facility], [address] [facility]  o [phone]   Isamar Haley Accepts Medicare and other private insurances  o [address], [place], [address] [facility]  o [phone]  o [email or web address]   Dereje Rahman  o Accepts medicare and many private insurances  o [address] [facility] [address]  o [phone]

## 2021-04-02 ENCOUNTER — OFFICE VISIT (OUTPATIENT)
Dept: PSYCHOLOGY | Age: 56
End: 2021-04-02
Payer: COMMERCIAL

## 2021-04-02 DIAGNOSIS — F41.9 ANXIETY DISORDER, UNSPECIFIED TYPE: Primary | ICD-10-CM

## 2021-04-02 DIAGNOSIS — F32.A DEPRESSION, UNSPECIFIED DEPRESSION TYPE: ICD-10-CM

## 2021-04-02 PROCEDURE — 90832 PSYTX W PT 30 MINUTES: CPT | Performed by: PSYCHOLOGIST

## 2021-04-02 NOTE — LETTER
[facility]  [address] [facility] [address]  Phone: [phone]  Fax: [phone]    Gabo Garcia PSYD        April 2, 2021     Patient: Jillian Wray   YOB: 1965   Date of Visit: 4/2/2021       To Whom It May Concern: It is my opinion that Maggie Moe may return to work on 4/12/2021 with no restrictions. If you have any questions or concerns, please don't hesitate to call.     Sincerely,        Gabo Garcia PSYD

## 2021-04-02 NOTE — PROGRESS NOTES
(100mg), buproprion (300mg)   Past mental health treatment: medications    Social History:    Social: two brothers, step mom, father, 2 brothers, fiance    Family psychiatric history: mother (Alzheimer's)   Employment:     Social History     Tobacco Use    Smoking status: Never Smoker    Smokeless tobacco: Never Used   Substance Use Topics    Alcohol use: Yes     Frequency: 4 or more times a week     Drinks per session: 1 or 2     Binge frequency: Never     Comment: One glass of wine nightly      Social History     Substance and Sexual Activity   Drug Use No      Current Outpatient Medications   Medication Sig Dispense Refill    desvenlafaxine succinate (PRISTIQ) 50 MG TB24 extended release tablet Take 2 tablets by mouth daily 90 tablet 3    buPROPion (WELLBUTRIN XL) 300 MG extended release tablet TAKE 1 TABLET EVERY MORNING 90 tablet 3     No current facility-administered medications for this visit. O:  MSE:    Appearance: good hygiene   Attitude: cooperative and friendly  Consciousness: alert  Orientation: oriented to person, place, time, general circumstance  Memory: recent and remote memory intact  Attention/Concentration: intact during session  Psychomotor Activity:normal  Eye Contact: normal  Speech: normal rate and volume, well-articulated  Mood: better  Affect: congruent  Perception: within normal limits  Thought Content: perseverative  Thought Process: logical, coherent and goal-directed  Insight: poor  Judgment: intact  Ability to understand instructions: Yes  Ability to respond meaningfully: Yes  Morbid Ideation: no   Suicide Assessment: no suicidal ideation, plan, or intent  Homicidal Ideation: no    A:  Administered PHQ-9 (see below).   PHQ Scores 3/11/2019 5/30/2018 5/30/2018 5/23/2017   PHQ2 Score 0 0 1 4   PHQ9 Score 0 3 1 8     Interpretation of Total Score Depression Severity: 1-4 = Minimal depression, 5-9 = Mild depression, 10-14 = Moderate depression, 15-19 = Moderately severe depression, 20-27 = Severe depression    Assessment/Progress in treatment/Treatment plan:  Patient appears to be experiencing depressed mood and anxiety, exacerbated by recent death of his mother, fiance having cancer, and work stress. Current coping skills include talking with family and golf and factors maintaining symptoms include maladaptive beliefs about work. Is engaged in behavioral health treatment and reports improvement in mood. He had issues returning to work recently, but is now confident in his ability to return to work. Diagnosis:    1. Anxiety disorder, unspecified type    2. Depression, unspecified depression type       Patient Active Problem List   Diagnosis    Severe depression ([address]    VSD (ventricular septal defect)    Hypercholesterolemia        Plan:    Follow-up:   Return in about 4 weeks (around 4/30/2021). Pt interventions:  Established rapport, [place]-setting to identify pt's primary goals for [facility] visit / overall health, Supportive techniques, Provided Psychoeducation re: anxiety, avoidance, Engaged in values clarification, Set plan for engaging in more value-guided action, Praised pt for use of skills and Cognitive strategies to target maladaptive thoughts including \"I cannot let people down\" \"If I miss work, I am letting people down:,   --------------------------------------------------------------------------------------------------------    The following was discussed with patient at initial behavioral health visit: pt provided informed consent for the behavioral health program. Discussed with patient model of service to include the limits of confidentiality (i.e. abuse reporting, suicide/homicide intervention, subpoena of court, documentation in medical record/coordination with primary care team, etc.) and short-term intervention focused approach. Pt indicated understanding. Documentation was done using voice recognition dragon software.  Every effort was made to ensure

## 2021-05-05 ENCOUNTER — TELEPHONE (OUTPATIENT)
Dept: PSYCHOLOGY | Age: 56
End: 2021-05-05

## 2021-05-05 NOTE — TELEPHONE ENCOUNTER
[facility] called patient to discuss that [facility]'s last day is on 5/21/2021. Called to discuss treatment planning. Left voicemail requesting a return call.